# Patient Record
Sex: FEMALE | Race: OTHER | URBAN - METROPOLITAN AREA
[De-identification: names, ages, dates, MRNs, and addresses within clinical notes are randomized per-mention and may not be internally consistent; named-entity substitution may affect disease eponyms.]

---

## 2021-02-15 ENCOUNTER — IMPORTED ENCOUNTER (OUTPATIENT)
Dept: URBAN - METROPOLITAN AREA CLINIC 9 | Facility: CLINIC | Age: 76
End: 2021-02-15

## 2021-02-26 ENCOUNTER — IMPORTED ENCOUNTER (OUTPATIENT)
Dept: URBAN - METROPOLITAN AREA CLINIC 9 | Facility: CLINIC | Age: 76
End: 2021-02-26

## 2021-03-05 ENCOUNTER — IMPORTED ENCOUNTER (OUTPATIENT)
Dept: URBAN - METROPOLITAN AREA CLINIC 9 | Facility: CLINIC | Age: 76
End: 2021-03-05

## 2021-03-26 ENCOUNTER — IMPORTED ENCOUNTER (OUTPATIENT)
Dept: URBAN - METROPOLITAN AREA CLINIC 9 | Facility: CLINIC | Age: 76
End: 2021-03-26

## 2021-04-09 ENCOUNTER — IMPORTED ENCOUNTER (OUTPATIENT)
Dept: URBAN - METROPOLITAN AREA CLINIC 9 | Facility: CLINIC | Age: 76
End: 2021-04-09

## 2021-09-27 ENCOUNTER — IMPORTED ENCOUNTER (OUTPATIENT)
Dept: URBAN - METROPOLITAN AREA CLINIC 9 | Facility: CLINIC | Age: 76
End: 2021-09-27

## 2021-09-27 PROBLEM — H31.001: Noted: 2021-09-27

## 2021-09-27 PROBLEM — H04.123: Noted: 2021-09-27

## 2021-09-27 PROBLEM — H43.812: Noted: 2021-09-27

## 2021-09-27 PROBLEM — H35.411: Noted: 2021-09-27

## 2021-10-16 ASSESSMENT — TONOMETRY
OD_IOP_MMHG: 14
OD_IOP_MMHG: 9
OS_IOP_MMHG: 10
OD_IOP_MMHG: 20
OS_IOP_MMHG: 15
OS_IOP_MMHG: 19
OD_IOP_MMHG: 18
OD_IOP_MMHG: 12
OS_IOP_MMHG: 17

## 2021-10-16 ASSESSMENT — VISUAL ACUITY
OD_SC: 20/25 -2 SN
OD_CC: 20/30 - SN
OS_CC: 20/200 SN
OD_CC: 20/40 SN
OS_CC: 20/100 SN
OD_SC: CF 2FT SN
OS_CC: 20/80 SN
OD_SC: 20/20 SN
OD_SC: 20/25 + SN
OS_SC: CF 2FT SN
OD_CC: 20/40 SN
OS_SC: 20/20 SN
OD_CC: 20/50 - SN
OS_CC: 20/80 SN
OS_SC: 20/40 - SN

## 2022-06-21 RX ORDER — SUMATRIPTAN 100 MG/1
TABLET, FILM COATED ORAL
COMMUNITY

## 2024-01-16 ENCOUNTER — ESTABLISHED PATIENT (OUTPATIENT)
Facility: LOCATION | Age: 79
End: 2024-01-16

## 2024-01-16 DIAGNOSIS — H43.812: ICD-10-CM

## 2024-01-16 DIAGNOSIS — H31.001: ICD-10-CM

## 2024-01-16 DIAGNOSIS — H04.123: ICD-10-CM

## 2024-01-16 DIAGNOSIS — H35.411: ICD-10-CM

## 2024-01-16 DIAGNOSIS — Z96.1: ICD-10-CM

## 2024-01-16 PROCEDURE — 92015 DETERMINE REFRACTIVE STATE: CPT

## 2024-01-16 PROCEDURE — 92014 COMPRE OPH EXAM EST PT 1/>: CPT

## 2024-01-16 ASSESSMENT — TONOMETRY
OS_IOP_MMHG: 12
OD_IOP_MMHG: 12

## 2024-01-16 ASSESSMENT — VISUAL ACUITY
OD_SC: 20/20-2
OS_SC: 20/25+2

## 2025-01-16 ENCOUNTER — APPOINTMENT (OUTPATIENT)
Dept: URBAN - METROPOLITAN AREA CLINIC 20 | Facility: CLINIC | Age: 80
Setting detail: DERMATOLOGY
End: 2025-01-16

## 2025-01-16 DIAGNOSIS — Z71.89 OTHER SPECIFIED COUNSELING: ICD-10-CM

## 2025-01-16 DIAGNOSIS — D22 MELANOCYTIC NEVI: ICD-10-CM

## 2025-01-16 DIAGNOSIS — T07XXXA ABRASION OR FRICTION BURN OF OTHER, MULTIPLE, AND UNSPECIFIED SITES, WITHOUT MENTION OF INFECTION: ICD-10-CM

## 2025-01-16 DIAGNOSIS — L82.1 OTHER SEBORRHEIC KERATOSIS: ICD-10-CM

## 2025-01-16 DIAGNOSIS — L81.4 OTHER MELANIN HYPERPIGMENTATION: ICD-10-CM

## 2025-01-16 DIAGNOSIS — D18.0 HEMANGIOMA: ICD-10-CM

## 2025-01-16 DIAGNOSIS — L65.9 NONSCARRING HAIR LOSS, UNSPECIFIED: ICD-10-CM

## 2025-01-16 DIAGNOSIS — L57.8 OTHER SKIN CHANGES DUE TO CHRONIC EXPOSURE TO NONIONIZING RADIATION: ICD-10-CM

## 2025-01-16 PROBLEM — D22.72 MELANOCYTIC NEVI OF LEFT LOWER LIMB, INCLUDING HIP: Status: ACTIVE | Noted: 2025-01-16

## 2025-01-16 PROBLEM — D18.01 HEMANGIOMA OF SKIN AND SUBCUTANEOUS TISSUE: Status: ACTIVE | Noted: 2025-01-16

## 2025-01-16 PROBLEM — S20.319A ABRASION OF UNSPECIFIED FRONT WALL OF THORAX, INITIAL ENCOUNTER: Status: ACTIVE | Noted: 2025-01-16

## 2025-01-16 PROBLEM — D22.71 MELANOCYTIC NEVI OF RIGHT LOWER LIMB, INCLUDING HIP: Status: ACTIVE | Noted: 2025-01-16

## 2025-01-16 PROBLEM — D22.5 MELANOCYTIC NEVI OF TRUNK: Status: ACTIVE | Noted: 2025-01-16

## 2025-01-16 PROCEDURE — ? COUNSELING

## 2025-01-16 PROCEDURE — ? ADDITIONAL NOTES

## 2025-01-16 PROCEDURE — 99213 OFFICE O/P EST LOW 20 MIN: CPT

## 2025-01-16 ASSESSMENT — LOCATION DETAILED DESCRIPTION DERM
LOCATION DETAILED: SUPERIOR THORACIC SPINE
LOCATION DETAILED: RIGHT RIB CAGE
LOCATION DETAILED: RIGHT PROXIMAL DORSAL FOREARM
LOCATION DETAILED: EPIGASTRIC SKIN
LOCATION DETAILED: POSTERIOR MID-PARIETAL SCALP
LOCATION DETAILED: MIDDLE STERNUM
LOCATION DETAILED: RIGHT DISTAL POSTERIOR THIGH
LOCATION DETAILED: PERIUMBILICAL SKIN
LOCATION DETAILED: RIGHT LATERAL ABDOMEN
LOCATION DETAILED: LEFT ANTERIOR DISTAL THIGH
LOCATION DETAILED: RIGHT ANTERIOR DISTAL THIGH
LOCATION DETAILED: RIGHT MEDIAL UPPER BACK
LOCATION DETAILED: LEFT INFERIOR MEDIAL UPPER BACK
LOCATION DETAILED: LEFT INFERIOR LATERAL MIDBACK
LOCATION DETAILED: INFERIOR MID FOREHEAD
LOCATION DETAILED: RIGHT INFERIOR LATERAL MIDBACK
LOCATION DETAILED: LEFT PROXIMAL DORSAL FOREARM

## 2025-01-16 ASSESSMENT — LOCATION ZONE DERM
LOCATION ZONE: LEG
LOCATION ZONE: SCALP
LOCATION ZONE: TRUNK
LOCATION ZONE: ARM
LOCATION ZONE: FACE

## 2025-01-16 ASSESSMENT — LOCATION SIMPLE DESCRIPTION DERM
LOCATION SIMPLE: CHEST
LOCATION SIMPLE: INFERIOR FOREHEAD
LOCATION SIMPLE: ABDOMEN
LOCATION SIMPLE: LEFT UPPER BACK
LOCATION SIMPLE: LEFT THIGH
LOCATION SIMPLE: POSTERIOR SCALP
LOCATION SIMPLE: LEFT FOREARM
LOCATION SIMPLE: RIGHT FOREARM
LOCATION SIMPLE: RIGHT LOWER BACK
LOCATION SIMPLE: RIGHT THIGH
LOCATION SIMPLE: RIGHT UPPER BACK
LOCATION SIMPLE: LEFT LOWER BACK
LOCATION SIMPLE: UPPER BACK
LOCATION SIMPLE: RIGHT POSTERIOR THIGH

## 2025-01-16 NOTE — PROCEDURE: ADDITIONAL NOTES
Additional Notes: Presenting like an excoriation  today and patient states she just noticed it in last week, ,if not healed in several weeks, will return for evaluation.
Detail Level: Simple
Render Risk Assessment In Note?: no
Additional Notes: Recommended Minoxodil 5% foam to scalp. \\nRecommended OTC- Saw Palmetto oil to scalp.

## 2025-02-11 ENCOUNTER — COMPREHENSIVE EXAM (OUTPATIENT)
Age: 80
End: 2025-02-11

## 2025-02-11 DIAGNOSIS — Z96.1: ICD-10-CM

## 2025-02-11 DIAGNOSIS — H04.123: ICD-10-CM

## 2025-02-11 DIAGNOSIS — H31.001: ICD-10-CM

## 2025-02-11 DIAGNOSIS — H35.411: ICD-10-CM

## 2025-02-11 DIAGNOSIS — H43.812: ICD-10-CM

## 2025-02-11 PROCEDURE — 92134 CPTRZ OPH DX IMG PST SGM RTA: CPT

## 2025-02-11 PROCEDURE — 92014 COMPRE OPH EXAM EST PT 1/>: CPT
